# Patient Record
Sex: MALE | Race: WHITE | NOT HISPANIC OR LATINO
[De-identification: names, ages, dates, MRNs, and addresses within clinical notes are randomized per-mention and may not be internally consistent; named-entity substitution may affect disease eponyms.]

---

## 2024-02-27 ENCOUNTER — NON-APPOINTMENT (OUTPATIENT)
Age: 34
End: 2024-02-27

## 2024-02-27 PROBLEM — Z00.00 ENCOUNTER FOR PREVENTIVE HEALTH EXAMINATION: Status: ACTIVE | Noted: 2024-02-27

## 2024-03-04 ENCOUNTER — APPOINTMENT (OUTPATIENT)
Dept: PULMONOLOGY | Facility: CLINIC | Age: 34
End: 2024-03-04
Payer: OTHER GOVERNMENT

## 2024-03-04 VITALS
WEIGHT: 188 LBS | DIASTOLIC BLOOD PRESSURE: 80 MMHG | HEART RATE: 79 BPM | BODY MASS INDEX: 24.92 KG/M2 | SYSTOLIC BLOOD PRESSURE: 140 MMHG | OXYGEN SATURATION: 99 % | HEIGHT: 73 IN

## 2024-03-04 DIAGNOSIS — Z78.9 OTHER SPECIFIED HEALTH STATUS: ICD-10-CM

## 2024-03-04 DIAGNOSIS — G47.19 OTHER HYPERSOMNIA: ICD-10-CM

## 2024-03-04 PROCEDURE — 99204 OFFICE O/P NEW MOD 45 MIN: CPT

## 2024-03-04 RX ORDER — FLUTICASONE PROPIONATE AND SALMETEROL 50; 250 UG/1; UG/1
250-50 POWDER RESPIRATORY (INHALATION)
Refills: 0 | Status: ACTIVE | COMMUNITY

## 2024-03-04 RX ORDER — HYDROCHLOROTHIAZIDE 25 MG/1
25 TABLET ORAL
Refills: 0 | Status: ACTIVE | COMMUNITY

## 2024-03-04 NOTE — ASSESSMENT
[FreeTextEntry1] : Patient with fatigue, mental cloudiness and excess daytime sleepiness.  He does have a history of hypertension.  Is unclear if this is related to a sleep disorder such as sleep apnea or not.  It may be related to underlying PTSD.  Patient will be referred for an in lab sleep study to rule out sleep apnea.

## 2024-03-04 NOTE — HISTORY OF PRESENT ILLNESS
[TextBox_4] : 33-year-old male with excess daytime sleepiness, hypertension being referred for sleep evaluation.  Patient does apparently snore but is not clear how loud.  It is unclear if he has witnessed apneas.  He complains mainly of extreme fatigue, sleepiness and cloudiness of his mind.  His bedtime is between 10 and 1030 and he wakes up once per night usually for about half an hour.  He gets up at 630.  He is very sleepy during the day with an [place] score of 11.  He never feels rested in the morning.  He is 6 feet 1 weight 188.  He was seen previously and had a home sleep study which was unremarkable.  He was referred for an in lab study but it never got done.  Past medical history significant for hypertension and PTSD.  Current medications include losartan.  He is a non-smoker nondrinker.  Patient works as the  at [employer].

## 2024-03-19 ENCOUNTER — TRANSCRIPTION ENCOUNTER (OUTPATIENT)
Age: 34
End: 2024-03-19

## 2024-04-08 ENCOUNTER — TRANSCRIPTION ENCOUNTER (OUTPATIENT)
Age: 34
End: 2024-04-08